# Patient Record
Sex: FEMALE | Race: WHITE | NOT HISPANIC OR LATINO | URBAN - NONMETROPOLITAN AREA
[De-identification: names, ages, dates, MRNs, and addresses within clinical notes are randomized per-mention and may not be internally consistent; named-entity substitution may affect disease eponyms.]

---

## 2020-07-07 ENCOUNTER — OFFICE VISIT (OUTPATIENT)
Dept: OBSTETRICS AND GYNECOLOGY | Facility: CLINIC | Age: 40
End: 2020-07-07

## 2020-07-07 DIAGNOSIS — Z30.41 ORAL CONTRACEPTIVE PILL SURVEILLANCE: Primary | ICD-10-CM

## 2020-07-07 PROCEDURE — 99441 PR PHYS/QHP TELEPHONE EVALUATION 5-10 MIN: CPT | Performed by: NURSE PRACTITIONER

## 2020-07-07 RX ORDER — LEVONORGESTREL AND ETHINYL ESTRADIOL 0.15-0.03
1 KIT ORAL DAILY
Qty: 28 TABLET | Refills: 5 | Status: SHIPPED | OUTPATIENT
Start: 2020-07-07 | End: 2020-12-18 | Stop reason: SDUPTHER

## 2020-07-08 NOTE — PROGRESS NOTES
Subjective   Sammie Mathis is a 39 y.o. female.     You have chosen to receive care through a telephone visit. Do you consent to use a telephone visit for your medical care today? Yes    History of Present Illness   Pt presents via telephone, video is not available at the time as pt is on the road traveling. She is needing OCP refills. Has been on this OCP for a while and done great with it. Failed depo in the past due to irregular bleeding and weight gain. She does not currently have insurance and was previously seeing the health department but they are no longer seeing patients in office. She states she had a pap smear in July of last year and will complete one with me as soon as she gets insurance. Never had an abnormal pap before.     She has no other medical conditions or comorbidities. Denies HTN, DM, etc.     The following portions of the patient's history were reviewed and updated as appropriate: allergies, current medications, past family history, past medical history, past social history, past surgical history and problem list.    Review of Systems   Constitutional: Negative.    Respiratory: Negative.    Cardiovascular: Negative.    Endocrine: Negative.    Genitourinary: Negative.  Negative for menstrual problem.       Objective   Physical Exam  Telephone visit     Assessment/Plan   Sammie was seen today for contraception.    Diagnoses and all orders for this visit:    Oral contraceptive pill surveillance  -     levonorgestrel-ethinyl estradiol (Kurvelo) 0.15-30 MG-MCG per tablet; Take 1 tablet by mouth Daily.    I discussed R/B/A to COCs. We discussed cardiovascular risks and the need to complete a mammogram since she is now 39. Pt voices understanding and will complete this with her well woman exam when she gets insurance. We will readdress risk of contraceptives at that visit.     Total Time: 10 minutes

## 2020-12-17 ENCOUNTER — LAB (OUTPATIENT)
Dept: LAB | Facility: OTHER | Age: 40
End: 2020-12-17

## 2020-12-17 PROCEDURE — U0003 INFECTIOUS AGENT DETECTION BY NUCLEIC ACID (DNA OR RNA); SEVERE ACUTE RESPIRATORY SYNDROME CORONAVIRUS 2 (SARS-COV-2) (CORONAVIRUS DISEASE [COVID-19]), AMPLIFIED PROBE TECHNIQUE, MAKING USE OF HIGH THROUGHPUT TECHNOLOGIES AS DESCRIBED BY CMS-2020-01-R: HCPCS | Performed by: NURSE PRACTITIONER

## 2020-12-18 DIAGNOSIS — Z30.41 ORAL CONTRACEPTIVE PILL SURVEILLANCE: ICD-10-CM

## 2020-12-18 RX ORDER — LEVONORGESTREL AND ETHINYL ESTRADIOL 0.15-0.03
1 KIT ORAL DAILY
Qty: 28 TABLET | Refills: 0 | Status: SHIPPED | OUTPATIENT
Start: 2020-12-18 | End: 2021-01-18 | Stop reason: SDUPTHER

## 2021-01-18 DIAGNOSIS — Z30.41 ORAL CONTRACEPTIVE PILL SURVEILLANCE: ICD-10-CM

## 2021-01-18 RX ORDER — LEVONORGESTREL AND ETHINYL ESTRADIOL 0.15-0.03
1 KIT ORAL DAILY
Qty: 28 TABLET | Refills: 0 | Status: SHIPPED | OUTPATIENT
Start: 2021-01-18 | End: 2021-02-09 | Stop reason: DRUGHIGH

## 2021-01-18 NOTE — PROGRESS NOTES
Pt was scheduled for WWE today but started her period. We rescheduled until 1/26. I will send only 1 more pack in. Pt must be seen in office for any further refills.

## 2021-02-09 ENCOUNTER — LAB (OUTPATIENT)
Dept: LAB | Facility: OTHER | Age: 41
End: 2021-02-09

## 2021-02-09 ENCOUNTER — OFFICE VISIT (OUTPATIENT)
Dept: OBSTETRICS AND GYNECOLOGY | Facility: CLINIC | Age: 41
End: 2021-02-09

## 2021-02-09 VITALS
HEART RATE: 95 BPM | WEIGHT: 293 LBS | DIASTOLIC BLOOD PRESSURE: 78 MMHG | HEIGHT: 67 IN | SYSTOLIC BLOOD PRESSURE: 144 MMHG | BODY MASS INDEX: 45.99 KG/M2

## 2021-02-09 DIAGNOSIS — E66.01 MORBID OBESITY WITH BMI OF 45.0-49.9, ADULT (HCC): ICD-10-CM

## 2021-02-09 DIAGNOSIS — Z12.31 SCREENING MAMMOGRAM, ENCOUNTER FOR: ICD-10-CM

## 2021-02-09 DIAGNOSIS — Z01.419 ENCOUNTER FOR GYNECOLOGICAL EXAMINATION WITH PAPANICOLAOU SMEAR OF CERVIX: Primary | ICD-10-CM

## 2021-02-09 DIAGNOSIS — Z30.09 GENERAL COUNSELING AND ADVICE FOR CONTRACEPTIVE MANAGEMENT: ICD-10-CM

## 2021-02-09 PROCEDURE — 99396 PREV VISIT EST AGE 40-64: CPT | Performed by: NURSE PRACTITIONER

## 2021-02-09 RX ORDER — NORETHINDRONE ACETATE AND ETHINYL ESTRADIOL AND FERROUS FUMARATE 1MG-20(24)
1 KIT ORAL DAILY
Qty: 28 TABLET | Refills: 12 | Status: SHIPPED | OUTPATIENT
Start: 2021-02-09 | End: 2021-04-16

## 2021-02-10 NOTE — PROGRESS NOTES
Subjective   Chief Complaint   Patient presents with   • Gynecologic Exam     WWE   • Contraception     refills on OCP     Sammie Mathis is a 40 y.o. year old  presenting to be seen for her annual exam.  Today she has no complaints. Needs OCP refills. We previously had discussed on telehealth the risks of COCs. I stressed my concern today for her BP, weight, and age with the use of estrogens. Pt voices understanding of these risks. She had excessive weight gain and bleeding on Depo Provera. She declines an IUD and Nexplanon. She isn't good about remembering to take her pill at the same time every day so POP may not be effective. We discussed risks of MI, DVT, and stroke on estrogens. Pt wants to consider a BTL or Depo Provera again but inquires about staying on COCs for a short time while she makes this decision if she voices understanding and acceptance of the risks. We discussed it at length and pt wishes to remain on COCs. I will have to reduce the estrogen from 30mcg to 20mcgs. Pt is ok with this.     Patient's last menstrual period was 2021 (exact date).    OB History        1    Para   1    Term                AB        Living           SAB        TAB        Ectopic        Molar        Multiple        Live Births                    Current birth control method: OCP (estrogen/progesterone).    She is sexually active.  In the past 12 months there has not been new sexual partners.  Condoms are not typically used.  She would not like to be screened for STD's at today's exam.     Past 6 month menstrual history:    Cycle Frequency: regular, predictable and consistent every 28 - 32 days   Menstrual cycle character: flow is typically normal   Cycle Duration: 4 - 5   Number of heavy days of flows: 0   Dysmenorrhea: mild and is not affecting her activities of daily living   PMS: is not affecting her activities of daily living   Intermenstrual bleeding present: no   Post-coital bleeding present: no  "    She exercises regularly: no.  She wears her seat belt:yes.  She has concerns about domestic violence: no.  Last colonoscopy: Never  Last DEXA: Never  Last MMG: Never   Last pap: 6/2019  History of abnormal PAP: yes    The following portions of the patient's history were reviewed and updated as appropriate:problem list, current medications, allergies, past family history, past medical history, past social history and past surgical history.    Social History    Tobacco Use      Smoking status: Never Smoker      Smokeless tobacco: Never Used    Social History     Substance and Sexual Activity   Alcohol Use No      Review of Systems   Constitutional: Negative.  Negative for chills and fever.        Frequent fluctuations in weight   Respiratory: Negative.    Cardiovascular: Negative.    Gastrointestinal: Negative.  Negative for constipation and diarrhea.   Endocrine: Negative.    Genitourinary: Negative.  Negative for dysuria, menstrual problem, pelvic pain, vaginal bleeding and vaginal discharge.   Skin: Negative.    Neurological: Negative for dizziness, syncope, light-headedness and headaches.   Psychiatric/Behavioral: Negative for suicidal ideas. The patient is not nervous/anxious.          Objective   /78   Pulse 95   Ht 170.2 cm (67\")   Wt (!) 144 kg (316 lb 9.6 oz)   LMP 01/22/2021 (Exact Date)   Breastfeeding No   BMI 49.59 kg/m²     General:  well developed; well nourished  no acute distress  obese - Body mass index is 49.59 kg/m².   Skin:  No suspicious lesions seen   Thyroid: not examined   Breasts:  Examined in supine position  Symmetric without masses or skin dimpling  Nipples normal without inversion, lesions or discharge  There are no palpable axillary nodes   Abdomen: soft, non-tender; no masses  no umbilical or inguinal hernias are present  no hepato-splenomegaly  Normal findings: bowel sounds normal   Cardiac: Heart sounds are normal.  Regular rate and rhythm without murmur, gallop or " rub.   Resp: Normal expansion.  Clear to auscultation.  No rales, rhonchi, or wheezing.   Psych: alert,oriented, in NAD with a full range of affect, normal behavior and no psychotic features   Pelvis: Uterus:  Exam limited by  body habitus  Clinical staff was present for exam  External genitalia:  normal appearance of the external genitalia including Bartholin's and [place]'s glands.  :  urethral meatus normal;  Vaginal:  normal pink mucosa without prolapse or lesions  Cervix:  normal appearance.  Uterus:  not palpable  Adnexa:  non palpable bilaterally.  Rectal:  digital rectal exam not performed; anus visually normal appearing.     Lab Review   No data reviewed    Imaging  No data reviewed       Diagnoses and all orders for this visit:    Encounter for gynecological examination with Papanicolaou smear of cervix  -     Liquid-based Pap Smear, Screening  Pap results: I will send card in mail or call if abnormal. RTC annually for well woman exams    Screening mammogram, encounter for  -     Mammo Screening Digital Tomosynthesis Bilateral With CAD; Future  Screening MMG ordered. Pt is scheduled for 3/12/2021.     General counseling and advice for contraceptive management  -     norethindrone-ethinyl estradiol-ferrous fumarate (LOESTIN 24 FE) 1-20 MG-MCG(24) per tablet; Take 1 tablet by mouth Daily.   We discussed risks of MI, DVT, and stroke on estrogens. Pt wants to consider a BTL or Depo Provera again but inquires about staying on COCs for a short time while she makes this decision if she voices understanding and acceptance of the risks. We discussed it at length and pt wishes to remain on COCs. I will have to reduce the estrogen from 30mcg to 20mcgs. Pt is ok with this. Complete current pack, then switch to lower dose pill. If this one is too expensive she will let us know and I will change it to a 7 days placebo instead of 4. Pt agrees with this plan of care.     Morbid Obesity, BMI 45.0-49.9   Pt has fluctuated  60lbs back and forth for a while. She gets on a good diet and exercising and one bad choice of foods, leads her back into poor choices and weight gain. We discussed calorie tracking and exercise. Encouraged pt to seek healthy, low carb options.     This note was electronically signed.    Jessica Hoffman, AGUSTIN  February 10, 2021

## 2021-02-16 LAB
LAB AP CASE REPORT: NORMAL
PATH INTERP SPEC-IMP: NORMAL

## 2021-04-14 ENCOUNTER — PRIOR AUTHORIZATION (OUTPATIENT)
Dept: FAMILY MEDICINE CLINIC | Facility: CLINIC | Age: 41
End: 2021-04-14

## 2021-04-14 NOTE — TELEPHONE ENCOUNTER
Sammie Mathis (Eckert: [id number]) - [id number]  Junel Fe 24 1-20MG-MCG(24) tablets  Status: PA Response - Denied

## 2021-04-16 RX ORDER — NORETHINDRONE ACETATE AND ETHINYL ESTRADIOL 1; .02 MG/1; MG/1
1 TABLET ORAL DAILY
Qty: 21 TABLET | Refills: 17 | Status: SHIPPED | OUTPATIENT
Start: 2021-04-16 | End: 2022-07-05 | Stop reason: SDUPTHER

## 2022-07-05 RX ORDER — NORETHINDRONE ACETATE AND ETHINYL ESTRADIOL 1; .02 MG/1; MG/1
1 TABLET ORAL DAILY
Qty: 21 TABLET | Refills: 0 | Status: SHIPPED | OUTPATIENT
Start: 2022-07-05 | End: 2022-09-12

## 2022-07-05 NOTE — PROGRESS NOTES
Pt is self pay and was unable to afford the visit tomorrow. I will send 1 pack of OCPs but pt must be seen in office for anymore. She has rescheduled for 7/19.

## 2022-08-12 RX ORDER — ESTAZOLAM 2 MG/1
TABLET ORAL
Qty: 21 TABLET | Refills: 0 | OUTPATIENT
Start: 2022-08-12

## 2022-09-12 ENCOUNTER — OFFICE VISIT (OUTPATIENT)
Dept: OBSTETRICS AND GYNECOLOGY | Facility: CLINIC | Age: 42
End: 2022-09-12

## 2022-09-12 VITALS
HEIGHT: 68 IN | HEART RATE: 98 BPM | WEIGHT: 293 LBS | DIASTOLIC BLOOD PRESSURE: 88 MMHG | SYSTOLIC BLOOD PRESSURE: 126 MMHG | BODY MASS INDEX: 44.41 KG/M2

## 2022-09-12 DIAGNOSIS — Z12.31 SCREENING MAMMOGRAM, ENCOUNTER FOR: ICD-10-CM

## 2022-09-12 DIAGNOSIS — Z30.09 GENERAL COUNSELING AND ADVICE ON FEMALE CONTRACEPTION: ICD-10-CM

## 2022-09-12 DIAGNOSIS — Z01.419 ENCOUNTER FOR WELL WOMAN EXAM WITH ROUTINE GYNECOLOGICAL EXAM: Primary | ICD-10-CM

## 2022-09-12 PROCEDURE — 99396 PREV VISIT EST AGE 40-64: CPT | Performed by: NURSE PRACTITIONER

## 2022-09-12 RX ORDER — ACETAMINOPHEN AND CODEINE PHOSPHATE 120; 12 MG/5ML; MG/5ML
1 SOLUTION ORAL DAILY
Qty: 28 TABLET | Refills: 12 | Status: SHIPPED | OUTPATIENT
Start: 2022-09-12 | End: 2023-09-12

## 2022-09-12 NOTE — PROGRESS NOTES
Subjective   Chief Complaint   Patient presents with   • Gynecologic Exam     WWE   • Contraception     Refill OCP     Sammie Mathis is a 42 y.o. year old  presenting to be seen for her annual exam.  Today she has no complaints. Needs OCP refills. We previously had discussed on telehealth the risks of COCs. I stressed my concern today for her BP, weight, and age with the use of estrogens. Pt voices understanding of these risks. She had excessive weight gain and bleeding on Depo Provera. She declines an IUD and Nexplanon.  We discussed risks of MI, DVT, and stroke on estrogens. She agrees to trying POP. She voices complete understanding of the need to take it at the exact same time every day.     Patient's last menstrual period was 08/10/2022.    OB History        1    Para   1    Term   1            AB        Living   1       SAB        IAB        Ectopic        Molar        Multiple        Live Births                    Current birth control method: OCP (estrogen/progesterone).    She is sexually active.  In the past 12 months there has not been new sexual partners.  Condoms are not typically used.  She would not like to be screened for STD's at today's exam.     Past 6 month menstrual history:    Cycle Frequency: regular, predictable and consistent every 28 - 32 days   Menstrual cycle character: flow is typically normal   Cycle Duration: 4 - 7   Number of heavy days of flows: 0   Dysmenorrhea: mild and is not affecting her activities of daily living   PMS: is not affecting her activities of daily living   Intermenstrual bleeding present: no   Post-coital bleeding present: no     She exercises regularly: no.  She wears her seat belt:yes.  She has concerns about domestic violence: no.  Last colonoscopy: Never  Last DEXA: Never  Last MMG: 3/12/21  Last pap: 21, HPV negative   History of abnormal PAP: yes    The following portions of the patient's history were reviewed and updated as  "appropriate:problem list, current medications, allergies, past family history, past medical history, past social history and past surgical history.    Social History    Tobacco Use      Smoking status: Never Smoker      Smokeless tobacco: Never Used    Social History     Substance and Sexual Activity   Alcohol Use No      Review of Systems   Constitutional: Negative.  Negative for chills and fever. Unexpected weight change: 15lb gain in the last year.        Frequent fluctuations in weight   Respiratory: Negative.    Cardiovascular: Negative.    Gastrointestinal: Negative.  Negative for constipation and diarrhea.   Endocrine: Negative.    Genitourinary: Negative.  Negative for dysuria, menstrual problem, pelvic pain, vaginal bleeding and vaginal discharge.   Skin: Negative.    Neurological: Negative for dizziness, syncope, light-headedness and headaches.   Psychiatric/Behavioral: Negative for suicidal ideas. The patient is not nervous/anxious.          Objective   /88   Pulse 98   Ht 172.7 cm (68\")   Wt (!) 150 kg (330 lb 12.8 oz)   LMP 08/10/2022   Breastfeeding No   BMI 50.30 kg/m²     General:  well developed; well nourished  no acute distress  obese - Body mass index is 50.3 kg/m².   Skin:  No suspicious lesions seen   Thyroid: not examined   Breasts:  Examined in supine position  Symmetric without masses or skin dimpling  Nipples normal without inversion, lesions or discharge  There are no palpable axillary nodes   Abdomen: soft, non-tender; no masses  no umbilical or inguinal hernias are present  no hepato-splenomegaly  Normal findings: bowel sounds normal   Cardiac: Heart sounds are normal.  Regular rate and rhythm without murmur, gallop or rub.   Resp: Normal expansion.  Clear to auscultation.  No rales, rhonchi, or wheezing.   Psych: alert,oriented, in NAD with a full range of affect, normal behavior and no psychotic features   Pelvis: Uterus:  Exam limited by  body habitus  Clinical staff was " present for exam  External genitalia:  normal appearance of the external genitalia including Bartholin's and [place]'s glands.  :  urethral meatus normal;  Vaginal:  normal pink mucosa without prolapse or lesions  Cervix:  normal appearance.  Uterus:  not palpable  Adnexa:  non palpable bilaterally.  Rectal:  digital rectal exam not performed; anus visually normal appearing.     Lab Review   No data reviewed    Imaging  Mammogram report       Diagnoses and all orders for this visit:    1. Encounter for well woman exam with routine gynecological exam (Primary)    2. General counseling and advice on female contraception  -     norethindrone (MICRONOR) 0.35 MG tablet; Take 1 tablet by mouth Daily.  Dispense: 28 tablet; Refill: 12    3. Screening mammogram, encounter for  -     Mammo Screening Digital Tomosynthesis Bilateral With CAD; Future      Cytology not indicated. RTC annually for WWE or sooner PRN.     SBE encouraged monthly. MMG annually. She is due. She has no insurance and doesn't foresee getting it anytime soon. We will see about getting her scheduled through the [facility].     I stressed my concern today for her BP, weight, and age with the use of estrogens. Pt voices understanding of these risks. She had excessive weight gain and bleeding on Depo Provera. She declines an IUD and Nexplanon.  We discussed risks of MI, DVT, and stroke on estrogens. She agrees to trying POP. She voices complete understanding of the need to take it at the exact same time every day. Quick start today but use condoms for at least 1 week.     Discussed weight gain. She admits to boredom eating and stress eating. She is not dieting or exercising. Encouraged weight management strategies. Needs routine fasting labs with a PCP.     This note was electronically signed.    Jessica Hoffman, APRN  September 12, 2022

## 2023-09-05 DIAGNOSIS — Z30.09 GENERAL COUNSELING AND ADVICE ON FEMALE CONTRACEPTION: ICD-10-CM

## 2023-09-05 RX ORDER — ACETAMINOPHEN AND CODEINE PHOSPHATE 120; 12 MG/5ML; MG/5ML
1 SOLUTION ORAL DAILY
Qty: 28 TABLET | Refills: 2 | Status: SHIPPED | OUTPATIENT
Start: 2023-09-05 | End: 2024-09-04